# Patient Record
Sex: MALE | Race: ASIAN | NOT HISPANIC OR LATINO | URBAN - METROPOLITAN AREA
[De-identification: names, ages, dates, MRNs, and addresses within clinical notes are randomized per-mention and may not be internally consistent; named-entity substitution may affect disease eponyms.]

---

## 2024-01-16 ENCOUNTER — EMERGENCY (EMERGENCY)
Facility: HOSPITAL | Age: 44
LOS: 1 days | Discharge: HOME | End: 2024-01-16
Attending: STUDENT IN AN ORGANIZED HEALTH CARE EDUCATION/TRAINING PROGRAM | Admitting: EMERGENCY MEDICINE
Payer: COMMERCIAL

## 2024-01-16 VITALS
RESPIRATION RATE: 18 BRPM | OXYGEN SATURATION: 97 % | SYSTOLIC BLOOD PRESSURE: 106 MMHG | TEMPERATURE: 98 F | DIASTOLIC BLOOD PRESSURE: 74 MMHG | HEART RATE: 61 BPM

## 2024-01-16 VITALS
OXYGEN SATURATION: 96 % | HEART RATE: 69 BPM | DIASTOLIC BLOOD PRESSURE: 79 MMHG | SYSTOLIC BLOOD PRESSURE: 122 MMHG | TEMPERATURE: 98 F | RESPIRATION RATE: 16 BRPM

## 2024-01-16 LAB
ALBUMIN SERPL ELPH-MCNC: 4.1 G/DL — SIGNIFICANT CHANGE UP (ref 3.3–5)
ALP SERPL-CCNC: 52 U/L — SIGNIFICANT CHANGE UP (ref 40–120)
ALT FLD-CCNC: 22 U/L — SIGNIFICANT CHANGE UP (ref 4–41)
ANION GAP SERPL CALC-SCNC: 9 MMOL/L — SIGNIFICANT CHANGE UP (ref 7–14)
APTT BLD: 33.2 SEC — SIGNIFICANT CHANGE UP (ref 24.5–35.6)
AST SERPL-CCNC: 20 U/L — SIGNIFICANT CHANGE UP (ref 4–40)
BASOPHILS # BLD AUTO: 0.03 K/UL — SIGNIFICANT CHANGE UP (ref 0–0.2)
BASOPHILS NFR BLD AUTO: 0.4 % — SIGNIFICANT CHANGE UP (ref 0–2)
BILIRUB SERPL-MCNC: 0.7 MG/DL — SIGNIFICANT CHANGE UP (ref 0.2–1.2)
BUN SERPL-MCNC: 14 MG/DL — SIGNIFICANT CHANGE UP (ref 7–23)
CALCIUM SERPL-MCNC: 9.6 MG/DL — SIGNIFICANT CHANGE UP (ref 8.4–10.5)
CHLORIDE SERPL-SCNC: 104 MMOL/L — SIGNIFICANT CHANGE UP (ref 98–107)
CO2 SERPL-SCNC: 28 MMOL/L — SIGNIFICANT CHANGE UP (ref 22–31)
CREAT SERPL-MCNC: 0.91 MG/DL — SIGNIFICANT CHANGE UP (ref 0.5–1.3)
EGFR: 107 ML/MIN/1.73M2 — SIGNIFICANT CHANGE UP
EOSINOPHIL # BLD AUTO: 0.3 K/UL — SIGNIFICANT CHANGE UP (ref 0–0.5)
EOSINOPHIL NFR BLD AUTO: 3.7 % — SIGNIFICANT CHANGE UP (ref 0–6)
GLUCOSE SERPL-MCNC: 97 MG/DL — SIGNIFICANT CHANGE UP (ref 70–99)
HCT VFR BLD CALC: 42.1 % — SIGNIFICANT CHANGE UP (ref 39–50)
HGB BLD-MCNC: 13.7 G/DL — SIGNIFICANT CHANGE UP (ref 13–17)
IANC: 3.62 K/UL — SIGNIFICANT CHANGE UP (ref 1.8–7.4)
IMM GRANULOCYTES NFR BLD AUTO: 0.2 % — SIGNIFICANT CHANGE UP (ref 0–0.9)
INR BLD: 0.95 RATIO — SIGNIFICANT CHANGE UP (ref 0.85–1.18)
LYMPHOCYTES # BLD AUTO: 3.34 K/UL — HIGH (ref 1–3.3)
LYMPHOCYTES # BLD AUTO: 41.4 % — SIGNIFICANT CHANGE UP (ref 13–44)
MCHC RBC-ENTMCNC: 27.8 PG — SIGNIFICANT CHANGE UP (ref 27–34)
MCHC RBC-ENTMCNC: 32.5 GM/DL — SIGNIFICANT CHANGE UP (ref 32–36)
MCV RBC AUTO: 85.4 FL — SIGNIFICANT CHANGE UP (ref 80–100)
MONOCYTES # BLD AUTO: 0.76 K/UL — SIGNIFICANT CHANGE UP (ref 0–0.9)
MONOCYTES NFR BLD AUTO: 9.4 % — SIGNIFICANT CHANGE UP (ref 2–14)
NEUTROPHILS # BLD AUTO: 3.62 K/UL — SIGNIFICANT CHANGE UP (ref 1.8–7.4)
NEUTROPHILS NFR BLD AUTO: 44.9 % — SIGNIFICANT CHANGE UP (ref 43–77)
NRBC # BLD: 0 /100 WBCS — SIGNIFICANT CHANGE UP (ref 0–0)
NRBC # FLD: 0 K/UL — SIGNIFICANT CHANGE UP (ref 0–0)
PLATELET # BLD AUTO: 177 K/UL — SIGNIFICANT CHANGE UP (ref 150–400)
POTASSIUM SERPL-MCNC: 3.9 MMOL/L — SIGNIFICANT CHANGE UP (ref 3.5–5.3)
POTASSIUM SERPL-SCNC: 3.9 MMOL/L — SIGNIFICANT CHANGE UP (ref 3.5–5.3)
PROT SERPL-MCNC: 7.3 G/DL — SIGNIFICANT CHANGE UP (ref 6–8.3)
PROTHROM AB SERPL-ACNC: 10.7 SEC — SIGNIFICANT CHANGE UP (ref 9.5–13)
RBC # BLD: 4.93 M/UL — SIGNIFICANT CHANGE UP (ref 4.2–5.8)
RBC # FLD: 13.2 % — SIGNIFICANT CHANGE UP (ref 10.3–14.5)
SODIUM SERPL-SCNC: 141 MMOL/L — SIGNIFICANT CHANGE UP (ref 135–145)
TROPONIN T, HIGH SENSITIVITY RESULT: <6 NG/L — SIGNIFICANT CHANGE UP
WBC # BLD: 8.07 K/UL — SIGNIFICANT CHANGE UP (ref 3.8–10.5)
WBC # FLD AUTO: 8.07 K/UL — SIGNIFICANT CHANGE UP (ref 3.8–10.5)

## 2024-01-16 PROCEDURE — 93010 ELECTROCARDIOGRAM REPORT: CPT

## 2024-01-16 PROCEDURE — 93306 TTE W/DOPPLER COMPLETE: CPT | Mod: 26

## 2024-01-16 PROCEDURE — 99236 HOSP IP/OBS SAME DATE HI 85: CPT | Mod: 25

## 2024-01-16 PROCEDURE — 71046 X-RAY EXAM CHEST 2 VIEWS: CPT | Mod: 26

## 2024-01-16 PROCEDURE — 71275 CT ANGIOGRAPHY CHEST: CPT | Mod: 26,MA

## 2024-01-16 PROCEDURE — 74174 CTA ABD&PLVS W/CONTRAST: CPT | Mod: 26,MA

## 2024-01-16 PROCEDURE — 93016 CV STRESS TEST SUPVJ ONLY: CPT | Mod: GC,MA

## 2024-01-16 PROCEDURE — 78451 HT MUSCLE IMAGE SPECT SING: CPT | Mod: 26,MA

## 2024-01-16 PROCEDURE — 99285 EMERGENCY DEPT VISIT HI MDM: CPT

## 2024-01-16 PROCEDURE — 93018 CV STRESS TEST I&R ONLY: CPT | Mod: GC,MA

## 2024-01-16 RX ORDER — KETOROLAC TROMETHAMINE 30 MG/ML
30 SYRINGE (ML) INJECTION ONCE
Refills: 0 | Status: DISCONTINUED | OUTPATIENT
Start: 2024-01-16 | End: 2024-01-16

## 2024-01-16 RX ORDER — LIDOCAINE 4 G/100G
1 CREAM TOPICAL ONCE
Refills: 0 | Status: COMPLETED | OUTPATIENT
Start: 2024-01-16 | End: 2024-01-16

## 2024-01-16 RX ORDER — ACETAMINOPHEN 500 MG
1000 TABLET ORAL ONCE
Refills: 0 | Status: COMPLETED | OUTPATIENT
Start: 2024-01-16 | End: 2024-01-16

## 2024-01-16 RX ORDER — IBUPROFEN 200 MG
1 TABLET ORAL
Qty: 21 | Refills: 0
Start: 2024-01-16 | End: 2024-01-22

## 2024-01-16 RX ADMIN — LIDOCAINE 1 PATCH: 4 CREAM TOPICAL at 14:01

## 2024-01-16 RX ADMIN — Medication 400 MILLIGRAM(S): at 06:26

## 2024-01-16 RX ADMIN — Medication 30 MILLIGRAM(S): at 06:25

## 2024-01-16 NOTE — ED ADULT TRIAGE NOTE - CHIEF COMPLAINT QUOTE
Pt arrives to ED c/o  rt sided CP for the past 4 days becoming severe last night.  Pt recently performed heavy lifting a t a gym for the first time in awhile.  Pain worsens with movement.  Pt denies medical history.

## 2024-01-16 NOTE — ED CDU PROVIDER INITIAL DAY NOTE - CLINICAL SUMMARY MEDICAL DECISION MAKING FREE TEXT BOX
42 Y/O M PMH HLD former smoker presents with R sided chest pain for the past 4 days which has been constant, states it has been severe this evening. Pt states he did work out at the gym previously but denies specific injury. EKG is not C/W a STEMI. Will obtain a troponin to eval for ACS and will obtain labs to eval for anemia or electrolyte disturbance. Pt was given IV Tylenol and Toradol for pain. Favor a musculoskeletal etiology of the patient's pain but as it is sharp, severe and radiating to the back a CTA was obtained to R/O PE or aortic dissection which was also neg. Plan is CDU placement for echocardiogram to r/o structural heart disease and stress testing to eval for a Cardiogenic etiology of the patient's sx. Troponin is neg in the ED (<6) and pain has been ongoing for multiple days. 44 Y/O M PMH HLD former smoker presents with R sided chest pain for the past 4 days which has been constant, states it has been severe this evening. Pt states he did work out at the gym previously but denies specific injury. EKG is not C/W a STEMI. Will obtain a troponin to eval for ACS and will obtain labs to eval for anemia or electrolyte disturbance. Pt was given IV Tylenol and Toradol for pain. Favor a musculoskeletal etiology of the patient's pain but as it is sharp, severe and radiating to the back a CTA was obtained to R/O PE or aortic dissection which was also neg. Plan is CDU placement for echocardiogram to r/o structural heart disease and stress testing to eval for a Cardiogenic etiology of the patient's sx. Troponin is neg in the ED (<6) and pain has been ongoing for multiple days.

## 2024-01-16 NOTE — ED CDU PROVIDER DISPOSITION NOTE - ATTENDING CONTRIBUTION TO CARE
CDU MD RAMOS:  I performed a face to face bedside interview with patient regarding history of present illness, review of symptoms and past medical history. I completed an independent physical exam.  I have discussed patient's plan of care with PA.   I agree with note as stated above, having amended the EMR as needed to reflect my findings. I have discussed the assessment and plan of care.  This includes during the time I functioned as the attending physician for this patient.

## 2024-01-16 NOTE — ED ADULT NURSE NOTE - NSFALLUNIVINTERV_ED_ALL_ED
Bed/Stretcher in lowest position, wheels locked, appropriate side rails in place/Call bell, personal items and telephone in reach/Instruct patient to call for assistance before getting out of bed/chair/stretcher/Non-slip footwear applied when patient is off stretcher/[place] to call system/Physically safe environment - no spills, clutter or unnecessary equipment/Purposeful proactive rounding/Room/bathroom lighting operational, light cord in reach Bed/Stretcher in lowest position, wheels locked, appropriate side rails in place/Call bell, personal items and telephone in reach/Instruct patient to call for assistance before getting out of bed/chair/stretcher/Non-slip footwear applied when patient is off stretcher/[place] to call system/Physically safe environment - no spills, clutter or unnecessary equipment/Purposeful proactive rounding/Room/bathroom lighting operational, light cord in reach

## 2024-01-16 NOTE — ED CDU PROVIDER INITIAL DAY NOTE - PROGRESS NOTE DETAILS
Pt sleeping.  No complaints at present.  Mild ttp over chest wall without rash or crepitus.  Pending stress echo cards recs. 43yoM PMH HLD, former smoker, p/w R sided chest pain radiating to his back over past few days. went to urgent care, given muscle relaxants with no relief. had worsening pain last night, prompting ER eval. no associated SOB. pt is a , and typically drives with his R arm. while in ED, labs wnl, trop <6, ECG NSR nonischemic. CTA chest abd pelvis negative for dissection. pt given analgesics. Pt transferred to CDU for NST, ECHO, teledoc eval. upon reassessment this AM, pt with reproducible R sided chest wall ttp and R scapular region ttp. suspect MSK etiology. teledoc evaluated patient.

## 2024-01-16 NOTE — ED ADULT TRIAGE NOTE - HEART RATE (BEATS/MIN)
61 Comments: Current cumulative dose after month 6 (23): 9900 mg\\nGoal cumulative dose range at 180-220 mg/[id number] mg

## 2024-01-16 NOTE — ED CDU PROVIDER DISPOSITION NOTE - CARE PROVIDERS DIRECT ADDRESSES
,[email or web address]

## 2024-01-16 NOTE — CONSULT NOTE ADULT - SUBJECTIVE AND OBJECTIVE BOX
Cardiology/Vascular Medicine Inpatient Consultation Note    Date of Admission:        CHIEF COMPLAINT:        HISTORY OF PRESENT ILLNESS:  HPI:          Allergies    No Known Allergies    Intolerances    	    MEDICATIONS:                  PAST MEDICAL & SURGICAL HISTORY:  Hyperlipemia      No significant past surgical history          FAMILY HISTORY:  FH: stroke (Mother)        SOCIAL HISTORY:    [ ] Non-smoker  [ ] Smoker  [ ] Alcohol    REVIEW OF SYSTEMS:  CONSTITUTIONAL: No fever, weight loss, or fatigue  EYES: No eye pain, visual disturbances, or discharge  ENMT:  No difficulty hearing, tinnitus, vertigo; No sinus or throat pain  NECK: No pain or stiffness  RESPIRATORY: No cough, wheezing, chills or hemoptysis; No Shortness of Breath  CARDIOVASCULAR: No chest pain, palpitations, passing out, dizziness, or leg swelling  GASTROINTESTINAL: No abdominal or epigastric pain. No nausea, vomiting, or hematemesis; No diarrhea or constipation. No melena or hematochezia.  GENITOURINARY: No dysuria, frequency, hematuria, or incontinence  NEUROLOGICAL: No headaches, memory loss, loss of strength, numbness, or tremors  SKIN: No itching, burning, rashes, or lesions   LYMPH Nodes: No enlarged glands  ENDOCRINE: No heat or cold intolerance; No hair loss  MUSCULOSKELETAL: No joint pain or swelling; No muscle, back, or extremity pain  PSYCHIATRIC: No depression, anxiety, mood swings, or difficulty sleeping  HEME/LYMPH: No easy bruising, or bleeding gums  ALLERY AND IMMUNOLOGIC: No hives or eczema	    [ ] All others negative	  [ ] Unable to obtain    PHYSICAL EXAM:  T(C): 36.6 (01-16-24 @ 05:06), Max: 36.6 (01-16-24 @ 05:06)  HR: 61 (01-16-24 @ 05:06) (61 - 61)  BP: 106/74 (01-16-24 @ 05:06) (106/74 - 106/74)  RR: 18 (01-16-24 @ 05:06) (18 - 18)  SpO2: 97% (01-16-24 @ 05:06) (97% - 97%)  Wt(kg): --  I&O's Summary      Appearance: Normal	  HEENT:   Normal oral mucosa, PERRL, EOMI	  Lymphatic: No lymphadenopathy  Cardiovascular: Normal S1 S2, No JVD, No murmurs, No edema  Respiratory: Lungs clear to auscultation	  Psychiatry: A & O x 3, Mood & affect appropriate  Gastrointestinal:  Soft, Non-tender, + BS	  Skin: No rashes, No ecchymoses, No cyanosis	  Neurologic: Non-focal  Extremities: Normal range of motion, No clubbing, cyanosis or edema  Vascular: Peripheral pulses palpable 2+ bilaterally      LABS:	 	    CBC Full  -  ( 16 Jan 2024 05:33 )  WBC Count : 8.07 K/uL  Hemoglobin : 13.7 g/dL  Hematocrit : 42.1 %  Platelet Count - Automated : 177 K/uL  Mean Cell Volume : 85.4 fL  Mean Cell Hemoglobin : 27.8 pg  Mean Cell Hemoglobin Concentration : 32.5 gm/dL  Auto Neutrophil # : 3.62 K/uL  Auto Lymphocyte # : 3.34 K/uL  Auto Monocyte # : 0.76 K/uL  Auto Eosinophil # : 0.30 K/uL  Auto Basophil # : 0.03 K/uL  Auto Neutrophil % : 44.9 %  Auto Lymphocyte % : 41.4 %  Auto Monocyte % : 9.4 %  Auto Eosinophil % : 3.7 %  Auto Basophil % : 0.4 %    01-16    141  |  104  |  14  ----------------------------<  97  3.9   |  28  |  0.91    Ca    9.6      16 Jan 2024 05:33    TPro  7.3  /  Alb  4.1  /  TBili  0.7  /  DBili  x   /  AST  20  /  ALT  22  /  AlkPhos  52  01-16      proBNP:   Lipid Profile:   HgA1c:   TSH:       CARDIAC MARKERS:            TELEMETRY: 	    ECG:   	  RADIOLOGY:  OTHER: 	      PREVIOUS DIAGNOSTIC CARDIOVASCULAR TESTING:      [ ]  Echocardiogram:  [ ]  Catheterization:  [ ]  Stress test:    [ ]  Vascular studies:  	  	     Cardiology/Vascular Medicine Inpatient Consultation Note    HISTORY OF PRESENT ILLNESS:    Patient is a 42 yo man with HLD, former smoker who presents with atypical chest discomfort.  He reports having symptoms primarily on his right chest with radiation to his right shoulder.  At times, he also has substernal chest discomfort.  No other associated cardiac complaints.  He has not had a recent cardiac assessment.  Denies having a significant FH early CAD/CVA/SCD/VTE.    My review of his 12-lead ECG and bedside telemetry --> SR, no events  Cardiac biomarkers flat.    CXR unremarkable for acute findings.  CTA chest and abdomen did not note the presence of central PE or aortic dissection.    At the time of my evaluation, the patient appeared clinically and hemodynamically stable.  Unremarkable physical exam.    He appeared euvolemic on my exam.    Impression:  Atypical chest pain, probably MSK in etiology  Will arrange for ischemic evaluation.    Recommendations:  1. Monitor on cardiac telemetry.  2, Anticipate with an unremarkable echocardiogram and stress test that the patient may be discharged to home without the need for additional inpatient cardiac testing.  He can f/u with me within 1-2 weeks for further evaluation as needed.    Allergies  No Known Allergies    MEDICATIONS:  None    PAST MEDICAL & SURGICAL HISTORY:  Hyperlipemia    No significant past surgical history    FAMILY HISTORY:  FH: stroke (Mother)    SOCIAL HISTORY:    As above, as per chart notes    REVIEW OF SYSTEMS:  As above, as per chart notes    PHYSICAL EXAM:  T(C): 36.6 (01-16-24 @ 05:06), Max: 36.6 (01-16-24 @ 05:06)  HR: 61 (01-16-24 @ 05:06) (61 - 61)  BP: 106/74 (01-16-24 @ 05:06) (106/74 - 106/74)  RR: 18 (01-16-24 @ 05:06) (18 - 18)  SpO2: 97% (01-16-24 @ 05:06) (97% - 97%)    Appearance: NAD  HEENT:   No apparent JVD  Cardiovascular: Normal S1 S2, No JVD, No murmurs, No edema  Respiratory: Lungs clear to auscultation	  Psychiatry: Awake, alert  Neurologic: Non-focal  Extremities: No LE edema      LABS:	 	    CBC Full  -  ( 16 Jan 2024 05:33 )  WBC Count : 8.07 K/uL  Hemoglobin : 13.7 g/dL  Hematocrit : 42.1 %  Platelet Count - Automated : 177 K/uL  Mean Cell Volume : 85.4 fL  Mean Cell Hemoglobin : 27.8 pg  Mean Cell Hemoglobin Concentration : 32.5 gm/dL  Auto Neutrophil # : 3.62 K/uL  Auto Lymphocyte # : 3.34 K/uL  Auto Monocyte # : 0.76 K/uL  Auto Eosinophil # : 0.30 K/uL  Auto Basophil # : 0.03 K/uL  Auto Neutrophil % : 44.9 %  Auto Lymphocyte % : 41.4 %  Auto Monocyte % : 9.4 %  Auto Eosinophil % : 3.7 %  Auto Basophil % : 0.4 %    01-16    141  |  104  |  14  ----------------------------<  97  3.9   |  28  |  0.91    Ca    9.6      16 Jan 2024 05:33    TPro  7.3  /  Alb  4.1  /  TBili  0.7  /  DBili  x   /  AST  20  /  ALT  22  /  AlkPhos  52  01-16    < from: CT Angio Chest PE Protocol w/ IV Cont (01.16.24 @ 06:00) >    ACC: [id number] EXAM:  CT ANGIO ABD PELV (W)AW IC   ORDERED BY: VIVIANA BLAKE     ACC: [id number] EXAM:  CT ANGIO CHEST PULM ART WAWIC   ORDERED BY: VIVIANA BLAKE     PROCEDURE DATE:  01/16/2024          INTERPRETATION:  CLINICAL INFORMATION: Chest pain.    COMPARISON: None.    CONTRAST/COMPLICATIONS:  IV Contrast: Omnipaque 350  90 cc administered   10 cc discarded  Oral Contrast: NONE  Complications: None reported at time of study completion    PROCEDURE:  CT Angiography of the Chest, Abdomen and Pelvis.  Precontrast imaging was performed through the chest followed by arterial   phase imaging of the chest, abdomen and pelvis.  Sagittal and coronal reformats were performed as well as 3D (MIP)   reconstructions.    FINDINGS:  CHEST:  LUNGS AND LARGE AIRWAYS: Patent central airways. No pneumonia, edema, or   acute finding in the lungs. Mild subsegmental dependent atelectasis.  PLEURA: No pleural effusion.  VESSELS: No thoracic aortic dissection or aneurysm. No pulmonary embolus.  HEART: Heart size is normal. No pericardial effusion.  MEDIASTINUM AND MURTAZA: No lymphadenopathy.  CHEST WALL AND LOWER NECK: Within normal limits.    ABDOMEN AND PELVIS:  LIVER: Within normal limits.  BILE DUCTS: Normal caliber.  GALLBLADDER: Within normal limits.  SPLEEN: Within normal limits.  PANCREAS: Within normal limits.  ADRENALS: Within normal limits.  KIDNEYS/URETERS: No hydronephrosis or renal stone or concerning mass.   Small left sided cyst.    BLADDER: Within normal limits.  REPRODUCTIVE ORGANS: Prostate within normal limits.    BOWEL: No bowel obstruction. Appendix not identified, no evidence of   appendicitis.  PERITONEUM: No ascites.  VESSELS: No abdominal aortic aneurysm or dissection. Patent major   abdominal aortic branches with no significant luminal narrowing.  RETROPERITONEUM/LYMPH NODES: No lymphadenopathy.  ABDOMINAL WALL: Within normal limits.  BONES: Within normal limits.    IMPRESSION:  No pulmonary embolus. No aortic dissection or aneurysm. No acute findings.        --- End of Report ---            MAC DANIELS MD; Attending Radiologist  This document has been electronically signed. Jan 16 2024  6:10AM    < end of copied text >  < from: Xray Chest 2 Views PA/Lat (01.16.24 @ 11:33) >    ACC: [id number] EXAM:  XR CHEST PA LAT 2V   ORDERED BY: ELY BOB     PROCEDURE DATE:  01/16/2024          INTERPRETATION:  EXAMINATION: XR CHEST PA AND LATERAL    CLINICAL INDICATION: Chest pain    TECHNIQUE: 2 views; Frontal and lateral views ofthe chest were obtained.    COMPARISON: Chest CT 5 hours prior    FINDINGS:  The heart is normal in size.  The lungs are clear. Normal pulmonary vasculature  No pneumothorax or pleural effusion.  No acute osseous abnormalities.    IMPRESSION:  Clear lungs. Findings unchanged    --- End of Report ---          ROXIE ZACARIAS MD; Resident Radiologist  This document has been electronically signed.  CONRAD HARTMAN DO; Attending Radiologist  This document has been electronically signed. Jan 16 2024 11:48AM    < end of copied text >   Cardiology/Vascular Medicine Inpatient Consultation Note    HISTORY OF PRESENT ILLNESS:    Patient is a 44 yo man with HLD, former smoker who presents with atypical chest discomfort.  He reports having symptoms primarily on his right chest with radiation to his right shoulder.  At times, he also has substernal chest discomfort.  No other associated cardiac complaints.  He has not had a recent cardiac assessment.  Denies having a significant FH early CAD/CVA/SCD/VTE.    My review of his 12-lead ECG and bedside telemetry --> SR, no events  Cardiac biomarkers flat.    CXR unremarkable for acute findings.  CTA chest and abdomen did not note the presence of central PE or aortic dissection.    At the time of my evaluation, the patient appeared clinically and hemodynamically stable.  Unremarkable physical exam.    He appeared euvolemic on my exam.    Impression:  Atypical chest pain, probably MSK in etiology  Will arrange for ischemic evaluation.    Recommendations:  1. Monitor on cardiac telemetry.  2, Anticipate with an unremarkable echocardiogram and stress test that the patient may be discharged to home without the need for additional inpatient cardiac testing.  He can f/u with me within 1-2 weeks for further evaluation as needed.    Allergies  No Known Allergies    MEDICATIONS:  None    PAST MEDICAL & SURGICAL HISTORY:  Hyperlipemia    No significant past surgical history    FAMILY HISTORY:  FH: stroke (Mother)    SOCIAL HISTORY:    As above, as per chart notes    REVIEW OF SYSTEMS:  As above, as per chart notes    PHYSICAL EXAM:  T(C): 36.6 (01-16-24 @ 05:06), Max: 36.6 (01-16-24 @ 05:06)  HR: 61 (01-16-24 @ 05:06) (61 - 61)  BP: 106/74 (01-16-24 @ 05:06) (106/74 - 106/74)  RR: 18 (01-16-24 @ 05:06) (18 - 18)  SpO2: 97% (01-16-24 @ 05:06) (97% - 97%)    Appearance: NAD  HEENT:   No apparent JVD  Cardiovascular: Normal S1 S2, No JVD, No murmurs, No edema  Respiratory: Lungs clear to auscultation	  Psychiatry: Awake, alert  Neurologic: Non-focal  Extremities: No LE edema      LABS:	 	    CBC Full  -  ( 16 Jan 2024 05:33 )  WBC Count : 8.07 K/uL  Hemoglobin : 13.7 g/dL  Hematocrit : 42.1 %  Platelet Count - Automated : 177 K/uL  Mean Cell Volume : 85.4 fL  Mean Cell Hemoglobin : 27.8 pg  Mean Cell Hemoglobin Concentration : 32.5 gm/dL  Auto Neutrophil # : 3.62 K/uL  Auto Lymphocyte # : 3.34 K/uL  Auto Monocyte # : 0.76 K/uL  Auto Eosinophil # : 0.30 K/uL  Auto Basophil # : 0.03 K/uL  Auto Neutrophil % : 44.9 %  Auto Lymphocyte % : 41.4 %  Auto Monocyte % : 9.4 %  Auto Eosinophil % : 3.7 %  Auto Basophil % : 0.4 %    01-16    141  |  104  |  14  ----------------------------<  97  3.9   |  28  |  0.91    Ca    9.6      16 Jan 2024 05:33    TPro  7.3  /  Alb  4.1  /  TBili  0.7  /  DBili  x   /  AST  20  /  ALT  22  /  AlkPhos  52  01-16    < from: CT Angio Chest PE Protocol w/ IV Cont (01.16.24 @ 06:00) >    ACC: [id number] EXAM:  CT ANGIO ABD PELV (W)AW IC   ORDERED BY: VIVIANA BLAKE     ACC: [id number] EXAM:  CT ANGIO CHEST PULM ART WAWIC   ORDERED BY: VIVIANA BLAKE     PROCEDURE DATE:  01/16/2024          INTERPRETATION:  CLINICAL INFORMATION: Chest pain.    COMPARISON: None.    CONTRAST/COMPLICATIONS:  IV Contrast: Omnipaque 350  90 cc administered   10 cc discarded  Oral Contrast: NONE  Complications: None reported at time of study completion    PROCEDURE:  CT Angiography of the Chest, Abdomen and Pelvis.  Precontrast imaging was performed through the chest followed by arterial   phase imaging of the chest, abdomen and pelvis.  Sagittal and coronal reformats were performed as well as 3D (MIP)   reconstructions.    FINDINGS:  CHEST:  LUNGS AND LARGE AIRWAYS: Patent central airways. No pneumonia, edema, or   acute finding in the lungs. Mild subsegmental dependent atelectasis.  PLEURA: No pleural effusion.  VESSELS: No thoracic aortic dissection or aneurysm. No pulmonary embolus.  HEART: Heart size is normal. No pericardial effusion.  MEDIASTINUM AND MURTAZA: No lymphadenopathy.  CHEST WALL AND LOWER NECK: Within normal limits.    ABDOMEN AND PELVIS:  LIVER: Within normal limits.  BILE DUCTS: Normal caliber.  GALLBLADDER: Within normal limits.  SPLEEN: Within normal limits.  PANCREAS: Within normal limits.  ADRENALS: Within normal limits.  KIDNEYS/URETERS: No hydronephrosis or renal stone or concerning mass.   Small left sided cyst.    BLADDER: Within normal limits.  REPRODUCTIVE ORGANS: Prostate within normal limits.    BOWEL: No bowel obstruction. Appendix not identified, no evidence of   appendicitis.  PERITONEUM: No ascites.  VESSELS: No abdominal aortic aneurysm or dissection. Patent major   abdominal aortic branches with no significant luminal narrowing.  RETROPERITONEUM/LYMPH NODES: No lymphadenopathy.  ABDOMINAL WALL: Within normal limits.  BONES: Within normal limits.    IMPRESSION:  No pulmonary embolus. No aortic dissection or aneurysm. No acute findings.        --- End of Report ---            MAC DANIELS MD; Attending Radiologist  This document has been electronically signed. Jan 16 2024  6:10AM    < end of copied text >  < from: Xray Chest 2 Views PA/Lat (01.16.24 @ 11:33) >    ACC: [id number] EXAM:  XR CHEST PA LAT 2V   ORDERED BY: ELY BOB     PROCEDURE DATE:  01/16/2024          INTERPRETATION:  EXAMINATION: XR CHEST PA AND LATERAL    CLINICAL INDICATION: Chest pain    TECHNIQUE: 2 views; Frontal and lateral views ofthe chest were obtained.    COMPARISON: Chest CT 5 hours prior    FINDINGS:  The heart is normal in size.  The lungs are clear. Normal pulmonary vasculature  No pneumothorax or pleural effusion.  No acute osseous abnormalities.    IMPRESSION:  Clear lungs. Findings unchanged    --- End of Report ---          ROXIE ZACARIAS MD; Resident Radiologist  This document has been electronically signed.  CONRAD HARTMAN DO; Attending Radiologist  This document has been electronically signed. Jan 16 2024 11:48AM    < end of copied text >

## 2024-01-16 NOTE — ED ADULT NURSE NOTE - OBJECTIVE STATEMENT
Patient received in ED intake room 13. A&Ox4 and ambulatory. C/o right sided chest pain x 4 days. Pt states pain worsened last night after heavy lifting at gym. Pt appears uncomfortable. Pain worsens with movement. Respirations even and unlabored. No acute distress noted. Speaking in full and complete sentences. IV 20g placed to left AC, labs drawn and sent as ordered. Medicated as ordered, see MAR. Safety maintained. Awaiting imaging as ordered.

## 2024-01-16 NOTE — ED CDU PROVIDER INITIAL DAY NOTE - ATTENDING APP SHARED VISIT CONTRIBUTION OF CARE
I performed a history and physical exam of the patient and discussed their management with the resident/fellow/ACP/student. I have reviewed the resident/fellow/ACP/student note and agree with the documented findings and plan of care, except as noted. I have personally performed a substantive portion of the visit including all aspects of the medical decision making. My medical decision making and observations are found above. Please refer to any progress notes for updates on clinical course.    43-year-old male with past medical history of HLD, former smoker presenting with chest pain x 4 days. Patient reports right-sided chest pain, got acutely severe/worse tonight, sharp in quality, radiating to the back intermittently.  Denies any recent fall/trauma.  Mildly worse with movement.  Taking pain medications at home with no relief.  Denies any associated SOB, LOC, palpitations, diaphoresis, focal weakness, numbness/tingling, saddle anesthesia, fecal/urinary incontinence, leg swelling/pain, hemoptysis, recent travel/bedbound nature, history of blood clots.  No fever/chills, N/V/D, cough, rashes. No prior echo/stress performed in past.     Gen: WDWN, uncomfortable appearing/holding chest   HEENT: No nasal discharge, mucous membranes moist  CV: RRR, +S1/S2, no M/R/G, equal b/l radial pulses 2+  Resp: CTAB, no W/R/R, SPO2 >95% on RA, no increased WOB   GI: Abdomen soft non-distended, NTTP, no masses/organomegaly   MSK/Skin: No CVA tenderness, no open wounds, no bruising, no LE edema/calf tenderness  Neuro: A&Ox4, MS +5/5 in UE and LE BL, gross sensation intact in UE and LE BL  Psych: appropriate mood    MDM:  43-year-old male with past medical history of HLD, former smoker presenting with chest pain x 4 days, became acutely worse tonight, no PE risk factors, no FND with no associated neurologic symptoms, hemodynamically stable, equal radial pulses bilaterally, very uncomfortable appearing/holding chest, EKG with no overt ischemic changes.  Exam/history concerning for but not limited to ACS versus MSK etiology versus low suspicion for aortic dissection given level of pain radiating to back. Will obtain CTA chest/AP, cardiac enzymes, basic labs, pain control     In ER: CTA was negative for dissection or PE, only showed a small incidental renal cyst. Patients pain improving after Toradol and IV Tylenol but still present. Trop <6. Placed in CDU for echo/stress and cardiac monitoring

## 2024-01-16 NOTE — ED CDU PROVIDER DISPOSITION NOTE - PATIENT PORTAL LINK FT
You can access the FollowMyHealth Patient Portal offered by [facility] by registering at the following website: [email or web address]. By joining [facility]’s FollowMyHealth portal, you will also be able to view your health information using other applications (apps) compatible with our system. You can access the FollowMyHealth Patient Portal offered by [facility] by registering at the following website: [email or web address]. By joining [facility]’s FollowMyHealth portal, you will also be able to view your health information using other applications (apps) compatible with our system.

## 2024-01-16 NOTE — ED CDU PROVIDER DISPOSITION NOTE - CLINICAL COURSE
43yoM PMH HLD, former smoker, p/w R sided chest pain radiating to his back over past few days. went to urgent care, given muscle relaxants with no relief. had worsening pain last night, prompting ER eval. no associated SOB. pt is a , and typically drives with his R arm. while in ED, labs wnl, trop <6, ECG NSR nonischemic. CTA chest abd pelvis negative for dissection. pt given analgesics. Pt transferred to CDU for NST, ECHO, teledoc eval. upon reassessment this AM, pt with reproducible R sided chest wall ttp and R scapular region ttp. suspect MSK etiology. teledoc evaluated patient. NST negative for inducible ischemia, ECHO normal EF no RWMA or significant valvular disease. return precautions discussed. pt updated on all results/findings, verbalized understanding and is in agreement with plan.

## 2024-01-16 NOTE — ED PROVIDER NOTE - CLINICAL SUMMARY MEDICAL DECISION MAKING FREE TEXT BOX
42 Y/O M PMH HLD former smoker presents with R sided chest pain for the past 4 days which has been constant, states it has been severe this evening. Pt states he did work out at the gym previously but denies specific injury. EKG is not C/W a STEMI. Will obtain a troponin to eval for ACS and will obtain labs to eval for anemia or electrolyte disturbance. Will give IV Tylenol for pain. Favor a musculoskeletal etiology of the patient's pain but as it is sharp, severe and radiating to the back will obtain a CTA to R/O Aortic dissection. 44 Y/O M PMH HLD former smoker presents with R sided chest pain for the past 4 days which has been constant, states it has been severe this evening. Pt states he did work out at the gym previously but denies specific injury. EKG is not C/W a STEMI. Will obtain a troponin to eval for ACS and will obtain labs to eval for anemia or electrolyte disturbance. Will give IV Tylenol for pain. Favor a musculoskeletal etiology of the patient's pain but as it is sharp, severe and radiating to the back will obtain a CTA to R/O Aortic dissection. 44 Y/O M PMH HLD former smoker presents with R sided chest pain for the past 4 days which has been constant, states it has been severe this evening. Pt states he did work out at the gym previously but denies specific injury. EKG is not C/W a STEMI. Will obtain a troponin to eval for ACS and will obtain labs to eval for anemia or electrolyte disturbance. Will give IV Tylenol for pain. Favor a musculoskeletal etiology of the patient's pain but as it is sharp, severe and radiating to the back will obtain a CTA to R/O Aortic dissection.    Les, Attending  See Attestation

## 2024-01-16 NOTE — CONSULT NOTE ADULT - ASSESSMENT
Patient is a 42 yo man with HLD, former smoker who presents with atypical chest discomfort.  He reports having symptoms primarily on his right chest with radiation to his right shoulder.  At times, he also has substernal chest discomfort.  No other associated cardiac complaints.  He has not had a recent cardiac assessment.  Denies having a significant FH early CAD/CVA/SCD/VTE.    My review of his 12-lead ECG and bedside telemetry --> SR, no events  Cardiac biomarkers flat.    CXR unremarkable for acute findings.  CTA chest and abdomen did not note the presence of central PE or aortic dissection.    At the time of my evaluation, the patient appeared clinically and hemodynamically stable.  Unremarkable physical exam.    He appeared euvolemic on my exam.    Impression:  Atypical chest pain, probably MSK in etiology  Will arrange for ischemic evaluation.    Recommendations:  1. Monitor on cardiac telemetry.  2, Anticipate with an unremarkable echocardiogram and stress test that the patient may be discharged to home without the need for additional inpatient cardiac testing.  He can f/u with me within 1-2 weeks for further evaluation as needed.     Patient is a 44 yo man with HLD, former smoker who presents with atypical chest discomfort.  He reports having symptoms primarily on his right chest with radiation to his right shoulder.  At times, he also has substernal chest discomfort.  No other associated cardiac complaints.  He has not had a recent cardiac assessment.  Denies having a significant FH early CAD/CVA/SCD/VTE.    My review of his 12-lead ECG and bedside telemetry --> SR, no events  Cardiac biomarkers flat.    CXR unremarkable for acute findings.  CTA chest and abdomen did not note the presence of central PE or aortic dissection.    At the time of my evaluation, the patient appeared clinically and hemodynamically stable.  Unremarkable physical exam.    He appeared euvolemic on my exam.    Impression:  Atypical chest pain, probably MSK in etiology  Will arrange for ischemic evaluation.    Recommendations:  1. Monitor on cardiac telemetry.  2, Anticipate with an unremarkable echocardiogram and stress test that the patient may be discharged to home without the need for additional inpatient cardiac testing.  He can f/u with me within 1-2 weeks for further evaluation as needed.

## 2024-01-16 NOTE — ED PROVIDER NOTE - OBJECTIVE STATEMENT
42 Y/O M PMH HLD former smoker presents with R sided chest pain for the past 4 days which has been constant, states it has been severe this evening. Pt states he did work out at the gym previously but denies specific injury. Pt states the pain is currently 8/10 in severity and feel sharp in quality. Pt states he has been taking Cyclobenzaprine and Tylenol without significant improvement. Pt denies numbness/tingling or weakness. Pt denies any other sx or acute complaints. 44 Y/O M PMH HLD former smoker presents with R sided chest pain for the past 4 days which has been constant, states it has been severe this evening. Pt states he did work out at the gym previously but denies specific injury. Pt states the pain is currently 8/10 in severity and feel sharp in quality. Pt states he has been taking Cyclobenzaprine and Tylenol without significant improvement. Pt denies numbness/tingling or weakness. Pt denies any other sx or acute complaints.

## 2024-01-16 NOTE — ED ADULT NURSE REASSESSMENT NOTE - NS ED NURSE REASSESS COMMENT FT1
Report handed off to CDU UDAY [facility]cant[facility] pt to be transported to bed 4.
Received patient from previous RN, A&O X4 , documentation as noted. Patient denies any pain or medical complaints at this time. Patient able to speak in clear sentences, respirations equal and unlabored. Patient pending bed placement. Patient in no acute distress at this time, will continue to monitor. VSS as noted in flowsheet.

## 2024-01-16 NOTE — ED PROVIDER NOTE - ATTENDING APP SHARED VISIT CONTRIBUTION OF CARE
I performed a history and physical exam of the patient and discussed their management with the resident/fellow/ACP/student. I have reviewed the resident/fellow/ACP/student note and agree with the documented findings and plan of care, except as noted. I have personally performed a substantive portion of the visit including all aspects of the medical decision making. My medical decision making and observations are found above. Please refer to any progress notes for updates on clinical course.    43-year-old male with past medical history of HLD, former smoker presenting with chest pain x 4 days. Patient reports right-sided chest pain, got acutely severe/worse tonight, sharp in quality, radiating to the back intermittently.  Denies any recent fall/trauma.  Mildly worse with movement.  Taking pain medications at home with no relief.  Denies any associated SOB, LOC, palpitations, diaphoresis, focal weakness, numbness/tingling, saddle anesthesia, fecal/urinary incontinence, leg swelling/pain, hemoptysis, recent travel/bedbound nature, history of blood clots.  No fever/chills, N/V/D, cough, rashes. No prior echo/stress performed in past.     Gen: WDWN, uncomfortable appearing/holding chest   HEENT: No nasal discharge, mucous membranes moist  CV: RRR, +S1/S2, no M/R/G, equal b/l radial pulses 2+  Resp: CTAB, no W/R/R, SPO2 >95% on RA, no increased WOB   GI: Abdomen soft non-distended, NTTP, no masses/organomegaly   MSK/Skin: No CVA tenderness, no open wounds, no bruising, no LE edema/calf tenderness  Neuro: A&Ox4, MS +5/5 in UE and LE BL, gross sensation intact in UE and LE BL  Psych: appropriate mood    MDM:  43-year-old male with past medical history of HLD, former smoker presenting with chest pain x 4 days, became acutely worse tonight, no PE risk factors, no FND with no associated neurologic symptoms, hemodynamically stable, equal radial pulses bilaterally, very uncomfortable appearing/holding chest.  Exam/history concerning for but not limited to ACS versus MSK etiology low suspicion for dissection given level of pain.  Will obtain CTA chest/AP, cardiac enzymes, basic labs, pain control and reassess I performed a history and physical exam of the patient and discussed their management with the resident/fellow/ACP/student. I have reviewed the resident/fellow/ACP/student note and agree with the documented findings and plan of care, except as noted. I have personally performed a substantive portion of the visit including all aspects of the medical decision making. My medical decision making and observations are found above. Please refer to any progress notes for updates on clinical course.    43-year-old male with past medical history of HLD, former smoker presenting with chest pain x 4 days. Patient reports right-sided chest pain, got acutely severe/worse tonight, sharp in quality, radiating to the back intermittently.  Denies any recent fall/trauma.  Mildly worse with movement.  Taking pain medications at home with no relief.  Denies any associated SOB, LOC, palpitations, diaphoresis, focal weakness, numbness/tingling, saddle anesthesia, fecal/urinary incontinence, leg swelling/pain, hemoptysis, recent travel/bedbound nature, history of blood clots.  No fever/chills, N/V/D, cough, rashes. No prior echo/stress performed in past.     Gen: WDWN, uncomfortable appearing/holding chest   HEENT: No nasal discharge, mucous membranes moist  CV: RRR, +S1/S2, no M/R/G, equal b/l radial pulses 2+  Resp: CTAB, no W/R/R, SPO2 >95% on RA, no increased WOB   GI: Abdomen soft non-distended, NTTP, no masses/organomegaly   MSK/Skin: No CVA tenderness, no open wounds, no bruising, no LE edema/calf tenderness  Neuro: A&Ox4, MS +5/5 in UE and LE BL, gross sensation intact in UE and LE BL  Psych: appropriate mood    MDM:  43-year-old male with past medical history of HLD, former smoker presenting with chest pain x 4 days, became acutely worse tonight, no PE risk factors, no FND with no associated neurologic symptoms, hemodynamically stable, equal radial pulses bilaterally, very uncomfortable appearing/holding chest, EKG with no over ischemic changes.  Exam/history concerning for but not limited to ACS versus MSK etiology low suspicion for dissection given level of pain. Will obtain CTA chest/AP, cardiac enzymes, basic labs, pain control and reassess I performed a history and physical exam of the patient and discussed their management with the resident/fellow/ACP/student. I have reviewed the resident/fellow/ACP/student note and agree with the documented findings and plan of care, except as noted. I have personally performed a substantive portion of the visit including all aspects of the medical decision making. My medical decision making and observations are found above. Please refer to any progress notes for updates on clinical course.    43-year-old male with past medical history of HLD, former smoker presenting with chest pain x 4 days. Patient reports right-sided chest pain, got acutely severe/worse tonight, sharp in quality, radiating to the back intermittently.  Denies any recent fall/trauma.  Mildly worse with movement.  Taking pain medications at home with no relief.  Denies any associated SOB, LOC, palpitations, diaphoresis, focal weakness, numbness/tingling, saddle anesthesia, fecal/urinary incontinence, leg swelling/pain, hemoptysis, recent travel/bedbound nature, history of blood clots.  No fever/chills, N/V/D, cough, rashes. No prior echo/stress performed in past.     Gen: WDWN, uncomfortable appearing/holding chest   HEENT: No nasal discharge, mucous membranes moist  CV: RRR, +S1/S2, no M/R/G, equal b/l radial pulses 2+  Resp: CTAB, no W/R/R, SPO2 >95% on RA, no increased WOB   GI: Abdomen soft non-distended, NTTP, no masses/organomegaly   MSK/Skin: No CVA tenderness, no open wounds, no bruising, no LE edema/calf tenderness  Neuro: A&Ox4, MS +5/5 in UE and LE BL, gross sensation intact in UE and LE BL  Psych: appropriate mood    MDM:  43-year-old male with past medical history of HLD, former smoker presenting with chest pain x 4 days, became acutely worse tonight, no PE risk factors, no FND with no associated neurologic symptoms, hemodynamically stable, equal radial pulses bilaterally, very uncomfortable appearing/holding chest, EKG with no overt ischemic changes.  Exam/history concerning for but not limited to ACS versus MSK etiology versus low suspicion for aortic dissection given level of pain radiating to back. Will obtain CTA chest/AP, cardiac enzymes, basic labs, pain control and reassess

## 2024-01-16 NOTE — ED CDU PROVIDER DISPOSITION NOTE - NSFOLLOWUPINSTRUCTIONS_ED_ALL_ED_FT
You were seen in our Emergency Department for chest pain.  Continue your home medications.  Follow up with your PMD in 48-72 hours.   Follow up with your Cardiologist in 48-72 hours (see referral list provided) or Dr. Portillo our cardiologist for further outpatient cardiac workup.  Bring copies of all paperwork/results to your doctor.  If you develop worsening pain, shortness of breath, dizziness, palpitations, rectal bleeding, numbness, tingling, weakness or any worsening symptoms return to our ED for evaluation. You were seen in our Emergency Department for chest pain.  Continue your home medications.  Follow up with your PMD in 48-72 hours.   Follow up with your Cardiologist in 48-72 hours (see referral list provided) or Dr. Portillo our cardiologist for further outpatient cardiac workup.  Take Ibuprofen 400mg, or may take max 600mg, every 6-8 hours with food as needed for moderate pain.  Take Tylenol up to 1000mg every 4-6 hours as needed for mild pain.  continue muscle relaxants as prescribed.  Utilize warm compresses to the area to relax the muscles.   Bring copies of all paperwork/results to your doctor.  If you develop worsening pain, shortness of breath, dizziness, palpitations, rectal bleeding, numbness, tingling, weakness or any worsening symptoms return to our ED for evaluation.

## 2024-01-16 NOTE — ED CDU PROVIDER DISPOSITION NOTE - CARE PROVIDER_API CALL
Marbin Portillo  Cardiology  [address] [place] [address], [address]  [place] [address]  Phone: [phone]  Fax: [phone]  Follow Up Time: Routine   Marbin Portillo  Cardiology  [address] [place] [address], [address]  [place] [address]  Phone: [phone]  Fax: [phone]  Follow Up Time: Routine

## 2024-01-16 NOTE — ED CDU PROVIDER INITIAL DAY NOTE - OBJECTIVE STATEMENT
42 Y/O M PMH HLD former smoker presents with R sided chest pain for the past 4 days which has been constant, states it has been severe this evening. Pt states he did work out at the gym previously but denies specific injury. Pt states the pain is currently 8/10 in severity and feel sharp in quality. Pt states he has been taking Cyclobenzaprine and Tylenol without significant improvement. Pt had a CTA while in the ED that was neg for dissection or PE, only shows a small incidental renal cyst. Ptt's pain improving after Toradol and IV Tylenol. 44 Y/O M PMH HLD former smoker presents with R sided chest pain for the past 4 days which has been constant, states it has been severe this evening. Pt states he did work out at the gym previously but denies specific injury. Pt states the pain is currently 8/10 in severity and feel sharp in quality. Pt states he has been taking Cyclobenzaprine and Tylenol without significant improvement. Pt had a CTA while in the ED that was neg for dissection or PE, only shows a small incidental renal cyst. Ptt's pain improving after Toradol and IV Tylenol.

## 2024-01-17 PROBLEM — E78.5 HYPERLIPIDEMIA, UNSPECIFIED: Chronic | Status: ACTIVE | Noted: 2024-01-16

## 2024-01-22 PROBLEM — Z00.00 ENCOUNTER FOR PREVENTIVE HEALTH EXAMINATION: Status: ACTIVE | Noted: 2024-01-22

## 2024-01-24 DIAGNOSIS — Z87.891 PERSONAL HISTORY OF NICOTINE DEPENDENCE: ICD-10-CM

## 2024-01-24 DIAGNOSIS — Z82.3 FAMILY HISTORY OF STROKE: ICD-10-CM

## 2024-01-24 DIAGNOSIS — Z82.49 FAMILY HISTORY OF ISCHEMIC HEART DISEASE AND OTHER DISEASES OF THE CIRCULATORY SYSTEM: ICD-10-CM

## 2024-01-24 RX ORDER — ACETAMINOPHEN 500 MG
500 TABLET ORAL
Refills: 0 | Status: ACTIVE | COMMUNITY

## 2024-01-24 RX ORDER — FAMOTIDINE 20 MG/1
20 TABLET, FILM COATED ORAL DAILY
Refills: 0 | Status: ACTIVE | COMMUNITY

## 2024-01-31 ENCOUNTER — APPOINTMENT (OUTPATIENT)
Dept: CARDIOLOGY | Facility: CLINIC | Age: 44
End: 2024-01-31

## 2024-03-07 ENCOUNTER — TRANSCRIPTION ENCOUNTER (OUTPATIENT)
Age: 44
End: 2024-03-07

## 2024-03-07 ENCOUNTER — NON-APPOINTMENT (OUTPATIENT)
Age: 44
End: 2024-03-07

## 2024-03-07 ENCOUNTER — APPOINTMENT (OUTPATIENT)
Dept: CARDIOLOGY | Facility: CLINIC | Age: 44
End: 2024-03-07
Payer: MEDICAID

## 2024-03-07 VITALS
SYSTOLIC BLOOD PRESSURE: 143 MMHG | WEIGHT: 174.6 LBS | HEART RATE: 75 BPM | DIASTOLIC BLOOD PRESSURE: 82 MMHG | BODY MASS INDEX: 28.06 KG/M2 | OXYGEN SATURATION: 94 % | HEIGHT: 66 IN

## 2024-03-07 DIAGNOSIS — E78.5 HYPERLIPIDEMIA, UNSPECIFIED: ICD-10-CM

## 2024-03-07 DIAGNOSIS — I10 ESSENTIAL (PRIMARY) HYPERTENSION: ICD-10-CM

## 2024-03-07 DIAGNOSIS — R07.9 CHEST PAIN, UNSPECIFIED: ICD-10-CM

## 2024-03-07 PROCEDURE — 99214 OFFICE O/P EST MOD 30 MIN: CPT | Mod: 25

## 2024-03-07 PROCEDURE — 93000 ELECTROCARDIOGRAM COMPLETE: CPT

## 2024-03-22 ENCOUNTER — APPOINTMENT (OUTPATIENT)
Dept: CT IMAGING | Facility: CLINIC | Age: 44
End: 2024-03-22

## 2024-07-29 ENCOUNTER — APPOINTMENT (OUTPATIENT)
Dept: ORTHOPEDIC SURGERY | Facility: CLINIC | Age: 44
End: 2024-07-29

## 2024-07-29 DIAGNOSIS — M25.561 PAIN IN RIGHT KNEE: ICD-10-CM

## 2024-10-08 ENCOUNTER — NON-APPOINTMENT (OUTPATIENT)
Age: 44
End: 2024-10-08

## 2024-10-08 ENCOUNTER — APPOINTMENT (OUTPATIENT)
Dept: ORTHOPEDIC SURGERY | Facility: CLINIC | Age: 44
End: 2024-10-08
Payer: MEDICAID

## 2024-10-08 VITALS — WEIGHT: 174 LBS | BODY MASS INDEX: 27.97 KG/M2 | HEIGHT: 66 IN

## 2024-10-08 DIAGNOSIS — M25.562 PAIN IN LEFT KNEE: ICD-10-CM

## 2024-10-08 PROCEDURE — 99203 OFFICE O/P NEW LOW 30 MIN: CPT | Mod: 25

## 2024-10-08 PROCEDURE — 73562 X-RAY EXAM OF KNEE 3: CPT | Mod: LT

## 2025-02-14 ENCOUNTER — APPOINTMENT (OUTPATIENT)
Dept: INTERNAL MEDICINE | Facility: CLINIC | Age: 45
End: 2025-02-14
Payer: MEDICAID

## 2025-02-14 ENCOUNTER — NON-APPOINTMENT (OUTPATIENT)
Age: 45
End: 2025-02-14

## 2025-02-14 VITALS
DIASTOLIC BLOOD PRESSURE: 76 MMHG | BODY MASS INDEX: 27.65 KG/M2 | TEMPERATURE: 97.1 F | OXYGEN SATURATION: 97 % | HEIGHT: 65.5 IN | WEIGHT: 168 LBS | HEART RATE: 68 BPM | SYSTOLIC BLOOD PRESSURE: 116 MMHG

## 2025-02-14 DIAGNOSIS — E78.5 HYPERLIPIDEMIA, UNSPECIFIED: ICD-10-CM

## 2025-02-14 DIAGNOSIS — Z00.00 ENCOUNTER FOR GENERAL ADULT MEDICAL EXAMINATION W/OUT ABNORMAL FINDINGS: ICD-10-CM

## 2025-02-14 DIAGNOSIS — R68.82 DECREASED LIBIDO: ICD-10-CM

## 2025-02-14 DIAGNOSIS — Z12.11 ENCOUNTER FOR SCREENING FOR MALIGNANT NEOPLASM OF COLON: ICD-10-CM

## 2025-02-14 PROCEDURE — 99386 PREV VISIT NEW AGE 40-64: CPT | Mod: 25

## 2025-02-18 LAB
25(OH)D3 SERPL-MCNC: 23.4 NG/ML
ALBUMIN SERPL ELPH-MCNC: 4.9 G/DL
ALP BLD-CCNC: 69 U/L
ALT SERPL-CCNC: 27 U/L
ANION GAP SERPL CALC-SCNC: 11 MMOL/L
APPEARANCE: CLEAR
AST SERPL-CCNC: 23 U/L
BASOPHILS # BLD AUTO: 0.03 K/UL
BASOPHILS NFR BLD AUTO: 0.4 %
BILIRUB SERPL-MCNC: 0.8 MG/DL
BILIRUBIN URINE: NEGATIVE
BLOOD URINE: NEGATIVE
BUN SERPL-MCNC: 14 MG/DL
CALCIUM SERPL-MCNC: 10.5 MG/DL
CHLORIDE SERPL-SCNC: 102 MMOL/L
CHOLEST SERPL-MCNC: 256 MG/DL
CO2 SERPL-SCNC: 27 MMOL/L
COLOR: YELLOW
CREAT SERPL-MCNC: 0.85 MG/DL
EGFR: 109 ML/MIN/1.73M2
EOSINOPHIL # BLD AUTO: 0.35 K/UL
EOSINOPHIL NFR BLD AUTO: 4.2 %
ESTIMATED AVERAGE GLUCOSE: 108 MG/DL
GLUCOSE QUALITATIVE U: NEGATIVE MG/DL
GLUCOSE SERPL-MCNC: 96 MG/DL
HBA1C MFR BLD HPLC: 5.4 %
HCT VFR BLD CALC: 44.1 %
HDLC SERPL-MCNC: 53 MG/DL
HGB BLD-MCNC: 15 G/DL
IMM GRANULOCYTES NFR BLD AUTO: 0.1 %
KETONES URINE: NEGATIVE MG/DL
LDLC SERPL CALC-MCNC: 165 MG/DL
LEUKOCYTE ESTERASE URINE: NEGATIVE
LYMPHOCYTES # BLD AUTO: 3.16 K/UL
LYMPHOCYTES NFR BLD AUTO: 37.7 %
MAN DIFF?: NORMAL
MCHC RBC-ENTMCNC: 28.4 PG
MCHC RBC-ENTMCNC: 34 G/DL
MCV RBC AUTO: 83.5 FL
MONOCYTES # BLD AUTO: 0.76 K/UL
MONOCYTES NFR BLD AUTO: 9.1 %
NEUTROPHILS # BLD AUTO: 4.08 K/UL
NEUTROPHILS NFR BLD AUTO: 48.5 %
NITRITE URINE: NEGATIVE
NONHDLC SERPL-MCNC: 204 MG/DL
PH URINE: 7
PLATELET # BLD AUTO: 182 K/UL
POTASSIUM SERPL-SCNC: 4.7 MMOL/L
PROT SERPL-MCNC: 7.9 G/DL
PROTEIN URINE: NEGATIVE MG/DL
PSA SERPL-MCNC: 1.65 NG/ML
RBC # BLD: 5.28 M/UL
RBC # FLD: 13.6 %
SODIUM SERPL-SCNC: 140 MMOL/L
SPECIFIC GRAVITY URINE: 1.01
TRIGL SERPL-MCNC: 208 MG/DL
TSH SERPL-ACNC: 2.57 UIU/ML
UROBILINOGEN URINE: 0.2 MG/DL
VIT B12 SERPL-MCNC: 456 PG/ML
WBC # FLD AUTO: 8.39 K/UL

## 2025-02-21 DIAGNOSIS — R79.89 OTHER SPECIFIED ABNORMAL FINDINGS OF BLOOD CHEMISTRY: ICD-10-CM

## 2025-02-21 RX ORDER — ERGOCALCIFEROL 1.25 MG/1
1.25 MG CAPSULE ORAL
Qty: 12 | Refills: 0 | Status: ACTIVE | COMMUNITY
Start: 2025-02-21 | End: 1900-01-01

## 2025-04-23 ENCOUNTER — APPOINTMENT (OUTPATIENT)
Dept: INTERNAL MEDICINE | Facility: CLINIC | Age: 45
End: 2025-04-23
Payer: MEDICAID

## 2025-04-23 VITALS
OXYGEN SATURATION: 96 % | BODY MASS INDEX: 27.98 KG/M2 | HEIGHT: 65.5 IN | DIASTOLIC BLOOD PRESSURE: 80 MMHG | TEMPERATURE: 97.9 F | SYSTOLIC BLOOD PRESSURE: 128 MMHG | WEIGHT: 170 LBS | HEART RATE: 80 BPM

## 2025-04-23 DIAGNOSIS — J30.2 OTHER SEASONAL ALLERGIC RHINITIS: ICD-10-CM

## 2025-04-23 DIAGNOSIS — F17.200 NICOTINE DEPENDENCE, UNSPECIFIED, UNCOMPLICATED: ICD-10-CM

## 2025-04-23 PROCEDURE — 99406 BEHAV CHNG SMOKING 3-10 MIN: CPT

## 2025-04-23 PROCEDURE — 99213 OFFICE O/P EST LOW 20 MIN: CPT | Mod: 25

## 2025-04-23 RX ORDER — FLUTICASONE PROPIONATE 50 UG/1
50 SPRAY, METERED NASAL
Qty: 1 | Refills: 1 | Status: ACTIVE | COMMUNITY
Start: 2025-04-23 | End: 1900-01-01

## 2025-04-23 RX ORDER — OLOPATADINE HCL 1 MG/ML
0.1 SOLUTION/ DROPS OPHTHALMIC TWICE DAILY
Qty: 1 | Refills: 0 | Status: COMPLETED | COMMUNITY
Start: 2025-04-23 | End: 2025-04-26

## 2025-04-25 PROBLEM — F17.200 CURRENT SMOKER: Status: ACTIVE | Noted: 2025-04-25

## 2025-05-01 ENCOUNTER — NON-APPOINTMENT (OUTPATIENT)
Age: 45
End: 2025-05-01

## 2025-05-01 ENCOUNTER — APPOINTMENT (OUTPATIENT)
Dept: OPHTHALMOLOGY | Facility: CLINIC | Age: 45
End: 2025-05-01
Payer: MEDICAID

## 2025-05-01 PROCEDURE — 99203 OFFICE O/P NEW LOW 30 MIN: CPT

## 2025-05-15 ENCOUNTER — APPOINTMENT (OUTPATIENT)
Dept: OPHTHALMOLOGY | Facility: CLINIC | Age: 45
End: 2025-05-15

## 2025-05-30 ENCOUNTER — APPOINTMENT (OUTPATIENT)
Dept: INTERNAL MEDICINE | Facility: CLINIC | Age: 45
End: 2025-05-30
Payer: MEDICAID

## 2025-05-30 VITALS
TEMPERATURE: 98.1 F | WEIGHT: 173 LBS | OXYGEN SATURATION: 97 % | SYSTOLIC BLOOD PRESSURE: 107 MMHG | HEIGHT: 65.5 IN | DIASTOLIC BLOOD PRESSURE: 76 MMHG | HEART RATE: 82 BPM | BODY MASS INDEX: 28.48 KG/M2

## 2025-05-30 DIAGNOSIS — R79.89 OTHER SPECIFIED ABNORMAL FINDINGS OF BLOOD CHEMISTRY: ICD-10-CM

## 2025-05-30 DIAGNOSIS — R05.8 OTHER SPECIFIED COUGH: ICD-10-CM

## 2025-05-30 DIAGNOSIS — R68.82 DECREASED LIBIDO: ICD-10-CM

## 2025-05-30 LAB — SHBG SERPL-SCNC: 21.4 NMOL/L

## 2025-05-30 PROCEDURE — 99214 OFFICE O/P EST MOD 30 MIN: CPT | Mod: 25

## 2025-05-30 RX ORDER — ERGOCALCIFEROL (VITAMIN D2) 50 MCG
50 MCG CAPSULE ORAL
Qty: 90 | Refills: 3 | Status: ACTIVE | COMMUNITY
Start: 2025-05-30 | End: 1900-01-01

## 2025-05-30 RX ORDER — FLUTICASONE FUROATE AND VILANTEROL TRIFENATATE 100; 25 UG/1; UG/1
100-25 POWDER RESPIRATORY (INHALATION)
Qty: 1 | Refills: 3 | Status: ACTIVE | COMMUNITY
Start: 2025-05-30 | End: 1900-01-01

## 2025-05-31 LAB
TESTOST FREE SERPL-MCNC: 3.4 PG/ML
TESTOST SERPL-MCNC: 216 NG/DL

## 2025-06-02 ENCOUNTER — APPOINTMENT (OUTPATIENT)
Dept: GASTROENTEROLOGY | Facility: CLINIC | Age: 45
End: 2025-06-02

## 2025-07-03 ENCOUNTER — APPOINTMENT (OUTPATIENT)
Dept: INTERNAL MEDICINE | Facility: CLINIC | Age: 45
End: 2025-07-03
Payer: MEDICAID

## 2025-07-03 PROBLEM — R79.89 LOW TESTOSTERONE LEVEL IN MALE: Status: ACTIVE | Noted: 2025-07-03

## 2025-07-03 PROCEDURE — 36415 COLL VENOUS BLD VENIPUNCTURE: CPT

## 2025-07-10 LAB
FSH SERPL-MCNC: 4.8 IU/L
LH SERPL-ACNC: 4.3 IU/L
SHBG SERPL-SCNC: 24.4 NMOL/L
TESTOST FREE SERPL-MCNC: 10.6 PG/ML
TESTOST SERPL-MCNC: 517 NG/DL

## 2025-07-21 ENCOUNTER — APPOINTMENT (OUTPATIENT)
Dept: INTERNAL MEDICINE | Facility: CLINIC | Age: 45
End: 2025-07-21

## 2025-07-21 ENCOUNTER — RX RENEWAL (OUTPATIENT)
Age: 45
End: 2025-07-21

## 2025-07-21 RX ORDER — LORATADINE 10 MG/1
10 TABLET ORAL
Qty: 90 | Refills: 0 | Status: ACTIVE | COMMUNITY
Start: 2025-07-21 | End: 1900-01-01